# Patient Record
Sex: FEMALE | Race: OTHER | Employment: FULL TIME | URBAN - METROPOLITAN AREA
[De-identification: names, ages, dates, MRNs, and addresses within clinical notes are randomized per-mention and may not be internally consistent; named-entity substitution may affect disease eponyms.]

---

## 2017-04-17 PROCEDURE — 80061 LIPID PANEL: CPT | Performed by: INTERNAL MEDICINE

## 2017-04-17 PROCEDURE — 82607 VITAMIN B-12: CPT | Performed by: INTERNAL MEDICINE

## 2017-04-17 PROCEDURE — 82043 UR ALBUMIN QUANTITATIVE: CPT | Performed by: INTERNAL MEDICINE

## 2017-04-17 PROCEDURE — 83540 ASSAY OF IRON: CPT | Performed by: INTERNAL MEDICINE

## 2017-04-17 PROCEDURE — 83036 HEMOGLOBIN GLYCOSYLATED A1C: CPT | Performed by: INTERNAL MEDICINE

## 2017-04-17 PROCEDURE — 82728 ASSAY OF FERRITIN: CPT | Performed by: INTERNAL MEDICINE

## 2017-04-17 PROCEDURE — 82306 VITAMIN D 25 HYDROXY: CPT | Performed by: INTERNAL MEDICINE

## 2017-04-17 PROCEDURE — 80053 COMPREHEN METABOLIC PANEL: CPT | Performed by: INTERNAL MEDICINE

## 2017-04-17 PROCEDURE — 82570 ASSAY OF URINE CREATININE: CPT | Performed by: INTERNAL MEDICINE

## 2017-04-17 PROCEDURE — 84443 ASSAY THYROID STIM HORMONE: CPT | Performed by: INTERNAL MEDICINE

## 2017-04-17 PROCEDURE — 85025 COMPLETE CBC W/AUTO DIFF WBC: CPT | Performed by: INTERNAL MEDICINE

## 2017-05-15 ENCOUNTER — OFFICE VISIT (OUTPATIENT)
Dept: RHEUMATOLOGY | Facility: CLINIC | Age: 62
End: 2017-05-15

## 2017-05-15 VITALS
SYSTOLIC BLOOD PRESSURE: 118 MMHG | HEART RATE: 65 BPM | TEMPERATURE: 98 F | HEIGHT: 63 IN | DIASTOLIC BLOOD PRESSURE: 80 MMHG | BODY MASS INDEX: 26.05 KG/M2 | WEIGHT: 147 LBS

## 2017-05-15 DIAGNOSIS — M18.11 OSTEOARTHRITIS OF THUMB, RIGHT: Primary | ICD-10-CM

## 2017-05-15 DIAGNOSIS — M54.50 CHRONIC LEFT-SIDED LOW BACK PAIN WITHOUT SCIATICA: ICD-10-CM

## 2017-05-15 DIAGNOSIS — G89.29 CHRONIC LEFT-SIDED LOW BACK PAIN WITHOUT SCIATICA: ICD-10-CM

## 2017-05-15 PROCEDURE — 99244 OFF/OP CNSLTJ NEW/EST MOD 40: CPT | Performed by: INTERNAL MEDICINE

## 2017-05-15 PROCEDURE — 20600 DRAIN/INJ JOINT/BURSA W/O US: CPT | Performed by: INTERNAL MEDICINE

## 2017-05-15 RX ORDER — METHYLPREDNISOLONE ACETATE 80 MG/ML
20 INJECTION, SUSPENSION INTRA-ARTICULAR; INTRALESIONAL; INTRAMUSCULAR; SOFT TISSUE ONCE
Status: COMPLETED | OUTPATIENT
Start: 2017-05-15 | End: 2017-05-15

## 2017-05-15 NOTE — PROGRESS NOTES
Anne Johnson is a 64year old female who presents for Patient presents with:  Joint Pain: bilateral thumb pain  Back Pain  . HPI:     I had the pleasure of seeing Anne Johnson on 5/15/2017 for evaluation. She was referred by dr. French.      She is a pleas by mouth 2 (two) times daily with meals. Disp: 60 tablet Rfl: 2   atenolol 25 MG Oral Tab TAKE ONE TABLET BY MOUTH ONCE DAILY Disp: 90 tablet Rfl: 1   triamcinolone acetonide 0.1 % External Ointment Apply 1 Application topically 2 (two) times daily.  Disp: years -   Joint/Muscluskeltal: see HPI, left lower back pain - no sciatica - she hurt it 3 years ago - came to hospital after a fall -   All other ROS are negative. Has anemia - has neede admission and 2 tranfusion - one times.  Had EGD/ c scope -     EXAM: AST (SGOT)      15-41 U/L 24   ALKALINE PHOSPHATASE       U/L 62   Total Bilirubin      0.3-1.2 mg/dL 0.4   TOTAL PROTEIN      5.9-8.4 g/dL 6.5   Albumin      3.5-4.8 g/dL 3.6   GLOBULIN, TOTAL      2.5-3.7 g/dL 2.9   A/G RATIO      1.0-2.0 1.2   A tendonitis pain - .dequervain's with ostoerahtirits of thumb   With  consent, I injected the patient's  finger with 0.25ml lidocaine  1% and 0.25ml depo 80.  It was under sterile technique using iodine and alcohol swabs and ethyl chloride was used as an latasha

## 2017-05-15 NOTE — PATIENT INSTRUCTIONS
1. Rest right thumb x 3 days   2. Physical therapy for lower back   3. Return to  clinic in 4-6 motnhs as needed. 4. Cont. meloxicam and hydrocodone as needed.    5. Note for work for1 day

## 2017-05-16 NOTE — PROCEDURES
With  consent, I injected the patient's  Right thumb cmc with 0.25ml lidocaine  1% and 0.25ml depo 80. It was under sterile technique using iodine and alcohol swabs and ethyl chloride was used as an anaesthetic spray. Pt.  tolerated it well.

## 2017-06-02 ENCOUNTER — TELEPHONE (OUTPATIENT)
Dept: RHEUMATOLOGY | Facility: CLINIC | Age: 62
End: 2017-06-02

## 2017-06-02 NOTE — TELEPHONE ENCOUNTER
Via , Jonn Lewis #[id number]. Please see below. 5/15 injection did not help patient at all. She feels her pain is the same or worse. 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 Pain is worsening at work. She takes Meloxicam daily.  She has tried OT

## 2017-06-02 NOTE — TELEPHONE ENCOUNTER
[place]icarag[place] speaking pt-Pt states she was in to see Jeremy Quintanillaocent on 5/15. Pt states she continues with Right hand thumb pain/Swollen. Pt states pain starts from the top on the ending thumb. Please advise.

## 2017-06-06 NOTE — TELEPHONE ENCOUNTER
She can try and see hand specialist - dr. [facility] - [phone] - if it didn't help -   Can also can give topical dicofenac gel for her thumb

## 2017-06-08 NOTE — TELEPHONE ENCOUNTER
LMTCB  Please ask pt question below per provider.     She can try and see hand specialist - dr. Drummond [place] - [phone] - if it didn't help -   Can also can give topical dicofenac gel for her thumb

## 2017-09-22 ENCOUNTER — HOSPITAL ENCOUNTER (OUTPATIENT)
Dept: MRI IMAGING | Facility: HOSPITAL | Age: 62
Discharge: HOME | End: 2017-09-22
Attending: INTERNAL MEDICINE
Payer: COMMERCIAL

## 2017-09-22 ENCOUNTER — HOSPITAL ENCOUNTER (OUTPATIENT)
Dept: GENERAL RADIOLOGY | Facility: HOSPITAL | Age: 62
Discharge: HOME | End: 2017-09-22
Attending: RADIOLOGY
Payer: COMMERCIAL

## 2017-09-22 DIAGNOSIS — M19.90 ARTHRITIS: ICD-10-CM

## 2017-09-22 DIAGNOSIS — M79.644 THUMB PAIN, RIGHT: ICD-10-CM

## 2017-09-22 PROCEDURE — 73218 MRI UPPER EXTREMITY W/O DYE: CPT | Performed by: INTERNAL MEDICINE

## 2017-09-22 PROCEDURE — 73140 X-RAY EXAM OF FINGER(S): CPT | Performed by: RADIOLOGY

## 2017-11-10 ENCOUNTER — LAB ENCOUNTER (OUTPATIENT)
Dept: LAB | Age: 62
End: 2017-11-10
Attending: INTERNAL MEDICINE
Payer: COMMERCIAL

## 2017-11-10 DIAGNOSIS — D64.9 ANEMIA, UNSPECIFIED TYPE: ICD-10-CM

## 2017-11-10 PROCEDURE — 82728 ASSAY OF FERRITIN: CPT

## 2017-11-10 PROCEDURE — 83540 ASSAY OF IRON: CPT

## 2017-11-10 PROCEDURE — 36415 COLL VENOUS BLD VENIPUNCTURE: CPT

## 2017-11-10 PROCEDURE — 85025 COMPLETE CBC W/AUTO DIFF WBC: CPT

## 2018-02-26 ENCOUNTER — HOSPITAL ENCOUNTER (OUTPATIENT)
Dept: MAMMOGRAPHY | Facility: HOSPITAL | Age: 63
Discharge: HOME | End: 2018-02-26
Attending: INTERNAL MEDICINE
Payer: COMMERCIAL

## 2018-02-26 ENCOUNTER — HOSPITAL ENCOUNTER (OUTPATIENT)
Dept: ULTRASOUND IMAGING | Facility: HOSPITAL | Age: 63
Discharge: HOME | End: 2018-02-26
Attending: INTERNAL MEDICINE
Payer: COMMERCIAL

## 2018-02-26 DIAGNOSIS — N64.4 PAIN IN BREAST: ICD-10-CM

## 2018-02-26 PROCEDURE — 76642 ULTRASOUND BREAST LIMITED: CPT | Performed by: INTERNAL MEDICINE

## 2018-02-26 PROCEDURE — 77066 DX MAMMO INCL CAD BI: CPT | Performed by: INTERNAL MEDICINE

## 2018-09-24 ENCOUNTER — APPOINTMENT (OUTPATIENT)
Dept: GENERAL RADIOLOGY | Age: 63
End: 2018-09-24
Attending: FAMILY MEDICINE
Payer: COMMERCIAL

## 2018-09-24 ENCOUNTER — HOSPITAL ENCOUNTER (OUTPATIENT)
Age: 63
Discharge: HOME | End: 2018-09-24
Attending: FAMILY MEDICINE
Payer: COMMERCIAL

## 2018-09-24 VITALS
SYSTOLIC BLOOD PRESSURE: 130 MMHG | DIASTOLIC BLOOD PRESSURE: 71 MMHG | RESPIRATION RATE: 22 BRPM | TEMPERATURE: 98 F | HEART RATE: 74 BPM | OXYGEN SATURATION: 100 %

## 2018-09-24 DIAGNOSIS — M76.821 TIBIALIS POSTERIOR TENDONITIS, RIGHT: ICD-10-CM

## 2018-09-24 DIAGNOSIS — M79.671 RIGHT FOOT PAIN: Primary | ICD-10-CM

## 2018-09-24 PROCEDURE — 99213 OFFICE O/P EST LOW 20 MIN: CPT

## 2018-09-24 PROCEDURE — 73650 X-RAY EXAM OF HEEL: CPT | Performed by: FAMILY MEDICINE

## 2018-09-24 NOTE — ED PROVIDER NOTES
Patient Seen in: [facility]    History   Patient presents with:   Foot Pain    Stated Complaint: FOOT PAIN    HPI    HPI: Sylvia Pompa is a 58year old female who presents sudden onset of left foot pain that occurred this Family History   Problem Relation Age of Onset   • Hypertension Mother    • Cancer Mother 28        breast   • Breast Cancer Mother 34   • Other (arthritis) Mother    • Cancer Son         breast   • Diabetes Son    • Breast Cancer Son 34   • Cancer Mater @Xr Heel (calcaneus) (min 2 Views), Right (cpt=73650)    Result Date: 9/24/2018  CONCLUSION:  1. No acute fracture dislocation.  2. Small right Achilles and plantar enthesophytes     Dictated by (CST): Dayna Frank MD on 9/24/2018 at 14:14     Approv

## 2018-09-24 NOTE — ED INITIAL ASSESSMENT (HPI)
Pt to IC with pain in her right foot. States she stands for work and pain has been increasing over the past couple of weeks. States pain is in her heel. Denies injury. States she works 6 days a week standing 10 hours a day. Works on a assembly line.

## 2021-07-14 NOTE — ED NOTES
Ace wrap applied to right foot by Juliana Ferrer MA
Pt states she has a walker at home. Pt verbalized understanding of necessity to be non weight bearing on her right foot.
Detail Level: Simple

## 2022-07-06 ENCOUNTER — TELEPHONE (OUTPATIENT)
Dept: HEMATOLOGY/ONCOLOGY | Facility: HOSPITAL | Age: 67
End: 2022-07-06

## 2022-07-06 NOTE — TELEPHONE ENCOUNTER
I spoke with the patient and advised her we need a referral from her PCP and she needs to have her medical records faxed.   I updated her insurance

## 2025-08-23 ENCOUNTER — TELEPHONE (OUTPATIENT)
Dept: NEUROLOGY | Age: 70
End: 2025-08-23

## 2025-08-29 ENCOUNTER — TELEPHONE (OUTPATIENT)
Dept: NEUROLOGY | Age: 70
End: 2025-08-29

## 2025-10-31 ENCOUNTER — APPOINTMENT (OUTPATIENT)
Dept: NEUROLOGY | Age: 70
End: 2025-10-31

## 2025-11-07 ENCOUNTER — APPOINTMENT (OUTPATIENT)
Dept: NEUROLOGY | Age: 70
End: 2025-11-07

## (undated) NOTE — MR AVS SNAPSHOT
[address] [address]  [phone]               Thank you for choosing us for your health care visit with Kamaljit Durant MD.  We are glad to serve you and happy to provide you with this tello responsibility. If you have questions, please call your plans customer service number located on your ID card. To schedule Physical Therapy at any of the [facility] facilities, please call [phone].     [facility] Take 1 tablet (325 mg total) by mouth 2 (two) times daily with meals. HYDROcodone-acetaminophen 5-325 MG Tabs   Take 1 tablet by mouth every 6 (six) hours as needed for Pain.    Commonly known as:  NORCO           lisinopril 5 MG Tabs   TAKE ONE T Tips for increasing your physical activity – Adults who are physically active are less likely to develop some chronic diseases than adults who are inactive.      HOW TO GET STARTED: HOW TO STAY MOTIVATED:   Start activities slowly and build up over time Do

## (undated) NOTE — LETTER
Date & Time: 9/24/2018, 2:29 PM  Patient: Amee Filler  Encounter Provider(s):    Sly Kenney DO       To Whom It May Concern:    Amee Helms was seen and treated in our department on 9/24/2018. She should not return to work until 10/1/18.     If

## (undated) NOTE — Clinical Note
5/15/2017          To Whom It May Concern:    Ilana Wright is currently under my medical care and may not return to work until 5/17/2017. Please excuse her for 5/16/2017 because of treatment she received under my medical care  on 5/15/2017.        If you re